# Patient Record
Sex: FEMALE
[De-identification: names, ages, dates, MRNs, and addresses within clinical notes are randomized per-mention and may not be internally consistent; named-entity substitution may affect disease eponyms.]

---

## 2021-08-28 ENCOUNTER — NURSE TRIAGE (OUTPATIENT)
Dept: OTHER | Facility: CLINIC | Age: 61
End: 2021-08-28

## 2021-08-28 NOTE — TELEPHONE ENCOUNTER
Brief description of triage: was with a friend who was exposed on Tuesday to Carlos. No direct exposure so far, but thinking about quarantining anyway for 10 days. Triage not needed - info only. Care advice provided, patient verbalizes understanding; denies any other questions or concerns; instructed to call back for any new or worsening symptoms. This triage is a result of a call to [address] [place] [address]. Please do not respond to the triage nurse through this encounter. Any subsequent communication should be directly with the patient. Reason for Disposition   Health Information question, no triage required and triager able to answer question    Answer Assessment - Initial Assessment Questions  1. REASON FOR CALL or QUESTION: \"What is your reason for calling today? \" or \"How can I best help you? \" or \"What question do you have that I can help answer? \"      Was with someone who was exposed to COVID, but no direct close contact with anyone yet.     Protocols used: INFORMATION ONLY CALL - NO TRIAGE-ADULT-